# Patient Record
Sex: FEMALE | Race: BLACK OR AFRICAN AMERICAN | NOT HISPANIC OR LATINO | Employment: UNEMPLOYED | URBAN - METROPOLITAN AREA
[De-identification: names, ages, dates, MRNs, and addresses within clinical notes are randomized per-mention and may not be internally consistent; named-entity substitution may affect disease eponyms.]

---

## 2021-09-02 ENCOUNTER — APPOINTMENT (OUTPATIENT)
Dept: GENERAL RADIOLOGY | Facility: HOSPITAL | Age: 76
End: 2021-09-02

## 2021-09-02 ENCOUNTER — HOSPITAL ENCOUNTER (OUTPATIENT)
Dept: CARDIOLOGY | Facility: HOSPITAL | Age: 76
Discharge: HOME | End: 2021-09-02
Admitting: INTERNAL MEDICINE

## 2021-09-02 ENCOUNTER — HOSPITAL ENCOUNTER (OUTPATIENT)
Facility: HOSPITAL | Age: 76
Setting detail: OBSERVATION
Discharge: HOME | End: 2021-09-03
Attending: EMERGENCY MEDICINE | Admitting: INTERNAL MEDICINE

## 2021-09-02 VITALS
RESPIRATION RATE: 16 BRPM | OXYGEN SATURATION: 100 % | DIASTOLIC BLOOD PRESSURE: 75 MMHG | SYSTOLIC BLOOD PRESSURE: 128 MMHG | HEART RATE: 72 BPM

## 2021-09-02 DIAGNOSIS — R07.9 CHEST PAIN, UNSPECIFIED TYPE: Primary | ICD-10-CM

## 2021-09-02 DIAGNOSIS — I10 ESSENTIAL HYPERTENSION: ICD-10-CM

## 2021-09-02 DIAGNOSIS — R07.2 PRECORDIAL PAIN: Primary | ICD-10-CM

## 2021-09-02 LAB
ALBUMIN SERPL-MCNC: 4.5 G/DL (ref 3.5–5.2)
ALBUMIN/GLOB SERPL: 1.7 G/DL
ALP SERPL-CCNC: 58 U/L (ref 39–117)
ALT SERPL W P-5'-P-CCNC: 14 U/L (ref 1–33)
ANION GAP SERPL CALCULATED.3IONS-SCNC: 14.7 MMOL/L (ref 5–15)
AST SERPL-CCNC: 17 U/L (ref 1–32)
BASOPHILS # BLD AUTO: 0.05 10*3/MM3 (ref 0–0.2)
BASOPHILS NFR BLD AUTO: 0.7 % (ref 0–1.5)
BILIRUB SERPL-MCNC: 0.2 MG/DL (ref 0–1.2)
BUN SERPL-MCNC: 16 MG/DL (ref 8–23)
BUN/CREAT SERPL: 20.5 (ref 7–25)
CALCIUM SPEC-SCNC: 9.5 MG/DL (ref 8.6–10.5)
CHLORIDE SERPL-SCNC: 102 MMOL/L (ref 98–107)
CO2 SERPL-SCNC: 23.3 MMOL/L (ref 22–29)
CREAT SERPL-MCNC: 0.78 MG/DL (ref 0.57–1)
DEPRECATED RDW RBC AUTO: 38.4 FL (ref 37–54)
EOSINOPHIL # BLD AUTO: 0.11 10*3/MM3 (ref 0–0.4)
EOSINOPHIL NFR BLD AUTO: 1.5 % (ref 0.3–6.2)
ERYTHROCYTE [DISTWIDTH] IN BLOOD BY AUTOMATED COUNT: 12.7 % (ref 12.3–15.4)
GFR SERPL CREATININE-BSD FRML MDRD: 88 ML/MIN/1.73
GLOBULIN UR ELPH-MCNC: 2.7 GM/DL
GLUCOSE SERPL-MCNC: 80 MG/DL (ref 65–99)
HCT VFR BLD AUTO: 42.1 % (ref 34–46.6)
HGB BLD-MCNC: 12.7 G/DL (ref 12–15.9)
HOLD SPECIMEN: NORMAL
HOLD SPECIMEN: NORMAL
IMM GRANULOCYTES # BLD AUTO: 0.02 10*3/MM3 (ref 0–0.05)
IMM GRANULOCYTES NFR BLD AUTO: 0.3 % (ref 0–0.5)
LYMPHOCYTES # BLD AUTO: 3.08 10*3/MM3 (ref 0.7–3.1)
LYMPHOCYTES NFR BLD AUTO: 42.1 % (ref 19.6–45.3)
MCH RBC QN AUTO: 25.3 PG (ref 26.6–33)
MCHC RBC AUTO-ENTMCNC: 30.2 G/DL (ref 31.5–35.7)
MCV RBC AUTO: 84 FL (ref 79–97)
MONOCYTES # BLD AUTO: 0.53 10*3/MM3 (ref 0.1–0.9)
MONOCYTES NFR BLD AUTO: 7.3 % (ref 5–12)
NEUTROPHILS NFR BLD AUTO: 3.52 10*3/MM3 (ref 1.7–7)
NEUTROPHILS NFR BLD AUTO: 48.1 % (ref 42.7–76)
NRBC BLD AUTO-RTO: 0 /100 WBC (ref 0–0.2)
PLATELET # BLD AUTO: 220 10*3/MM3 (ref 140–450)
PMV BLD AUTO: 10.3 FL (ref 6–12)
POTASSIUM SERPL-SCNC: 3.9 MMOL/L (ref 3.5–5.2)
PROT SERPL-MCNC: 7.2 G/DL (ref 6–8.5)
RBC # BLD AUTO: 5.01 10*6/MM3 (ref 3.77–5.28)
SARS-COV-2 ORF1AB RESP QL NAA+PROBE: NOT DETECTED
SODIUM SERPL-SCNC: 140 MMOL/L (ref 136–145)
TROPONIN T SERPL-MCNC: <0.01 NG/ML (ref 0–0.03)
TROPONIN T SERPL-MCNC: <0.01 NG/ML (ref 0–0.03)
WBC # BLD AUTO: 7.31 10*3/MM3 (ref 3.4–10.8)
WHOLE BLOOD HOLD SPECIMEN: NORMAL
WHOLE BLOOD HOLD SPECIMEN: NORMAL

## 2021-09-02 PROCEDURE — U0004 COV-19 TEST NON-CDC HGH THRU: HCPCS | Performed by: EMERGENCY MEDICINE

## 2021-09-02 PROCEDURE — 84484 ASSAY OF TROPONIN QUANT: CPT | Performed by: EMERGENCY MEDICINE

## 2021-09-02 PROCEDURE — 93005 ELECTROCARDIOGRAM TRACING: CPT | Performed by: EMERGENCY MEDICINE

## 2021-09-02 PROCEDURE — G0378 HOSPITAL OBSERVATION PER HR: HCPCS

## 2021-09-02 PROCEDURE — 99204 OFFICE O/P NEW MOD 45 MIN: CPT | Performed by: INTERNAL MEDICINE

## 2021-09-02 PROCEDURE — 85025 COMPLETE CBC W/AUTO DIFF WBC: CPT

## 2021-09-02 PROCEDURE — 84484 ASSAY OF TROPONIN QUANT: CPT

## 2021-09-02 PROCEDURE — 93005 ELECTROCARDIOGRAM TRACING: CPT

## 2021-09-02 PROCEDURE — C9803 HOPD COVID-19 SPEC COLLECT: HCPCS

## 2021-09-02 PROCEDURE — 99284 EMERGENCY DEPT VISIT MOD MDM: CPT

## 2021-09-02 PROCEDURE — 71046 X-RAY EXAM CHEST 2 VIEWS: CPT

## 2021-09-02 PROCEDURE — 80053 COMPREHEN METABOLIC PANEL: CPT | Performed by: EMERGENCY MEDICINE

## 2021-09-02 PROCEDURE — 36415 COLL VENOUS BLD VENIPUNCTURE: CPT

## 2021-09-02 PROCEDURE — 94760 N-INVAS EAR/PLS OXIMETRY 1: CPT

## 2021-09-02 RX ORDER — ASPIRIN 325 MG
325 TABLET ORAL ONCE
Status: DISCONTINUED | OUTPATIENT
Start: 2021-09-02 | End: 2021-09-03 | Stop reason: HOSPADM

## 2021-09-02 RX ORDER — SODIUM CHLORIDE 0.9 % (FLUSH) 0.9 %
10 SYRINGE (ML) INJECTION AS NEEDED
Status: DISCONTINUED | OUTPATIENT
Start: 2021-09-02 | End: 2021-09-03 | Stop reason: HOSPADM

## 2021-09-02 NOTE — ED PROVIDER NOTES
EMERGENCY DEPARTMENT ENCOUNTER    Room Number:  14/14  Date of encounter:  9/2/2021  PCP: Keri Avila MD  Patient Care Team:  Keri Avila MD as PCP - General (Internal Medicine)   Historian: Patient    HPI:  Chief Complaint: Chest pain  A complete HPI/ROS/PMH/PSH/SH/FH are unobtainable due to: Nothing    Context: Julienne Guadarrama is a 76 y.o. female who presents to the ED c/o chest pain today.  She reports that started around 8:00 this morning.  It was constant until she was sitting in our waiting room.  She reports that it felt like pressure in her left side of her chest.  She reports sometimes it radiated into her left jaw and sometimes down her left arm.  She had no shortness of breath, nausea, vomiting, diaphoresis with it.  She denies prior similar episodes.  Nothing seemed to make it worse or better.  She reports a history of hypertension, prediabetes, mildly elevated cholesterol.  She is not a smoker.  She reports she had problems with arrhythmias in the past but does not currently have a cardiologist.  She reports her last stress test was many years ago.  She denies any history of ischemic heart disease that she knows of.  She has had an aspirin and a half today.  She is currently pain-free.    Prior record review: Evaluated by Latricia Brock at the [facility] [place] [facility] today.  Sent to the emergency room for evaluation and admission.  Could not be a direct admit due to Covid.    PAST MEDICAL HISTORY  Active Ambulatory Problems     Diagnosis Date Noted   • No Active Ambulatory Problems     Resolved Ambulatory Problems     Diagnosis Date Noted   • No Resolved Ambulatory Problems     Past Medical History:   Diagnosis Date   • Hypertension        The patient has a COVID HM Topic on their chart, and they are fully vaccinated.    PAST SURGICAL HISTORY  No past surgical history on file.      FAMILY HISTORY  No family history on file.      SOCIAL HISTORY  Social History     Socioeconomic History   • Marital  status:      Spouse name: Not on file   • Number of children: Not on file   • Years of education: Not on file   • Highest education level: Not on file   Tobacco Use   • Smoking status: Never Smoker   • Smokeless tobacco: Never Used   Substance and Sexual Activity   • Alcohol use: Never         ALLERGIES  Patient has no known allergies.        REVIEW OF SYSTEMS  Review of Systems   Positive chest pain, negative shortness of breath, negative nausea, negative vomiting, negative fever, no chills  All systems reviewed and negative except for those discussed in HPI.       PHYSICAL EXAM    I have reviewed the triage vital signs and nursing notes.    ED Triage Vitals [09/02/21 1620]   Temp Heart Rate Resp BP SpO2   97.7 °F (36.5 °C) 59 16 128/77 100 %      Temp src Heart Rate Source Patient Position BP Location FiO2 (%)   Tympanic Monitor -- -- --       Physical Exam  GENERAL: Awake, alert, no acute distress  SKIN: Warm, dry  HENT: Normocephalic, atraumatic  EYES: no scleral icterus  CV: regular rhythm, regular rate  RESPIRATORY: normal effort, lungs clear  ABDOMEN: soft, nontender, nondistended  MUSCULOSKELETAL: no deformity  NEURO: alert, moves all extremities, follows commands          LAB RESULTS  Recent Results (from the past 24 hour(s))   Comprehensive Metabolic Panel    Collection Time: 09/02/21  4:36 PM    Specimen: Blood   Result Value Ref Range    Glucose 80 65 - 99 mg/dL    BUN 16 8 - 23 mg/dL    Creatinine 0.78 0.57 - 1.00 mg/dL    Sodium 140 136 - 145 mmol/L    Potassium 3.9 3.5 - 5.2 mmol/L    Chloride 102 98 - 107 mmol/L    CO2 23.3 22.0 - 29.0 mmol/L    Calcium 9.5 8.6 - 10.5 mg/dL    Total Protein 7.2 6.0 - 8.5 g/dL    Albumin 4.50 3.50 - 5.20 g/dL    ALT (SGPT) 14 1 - 33 U/L    AST (SGOT) 17 1 - 32 U/L    Alkaline Phosphatase 58 39 - 117 U/L    Total Bilirubin 0.2 0.0 - 1.2 mg/dL    eGFR  African Amer 88 >60 mL/min/1.73    Globulin 2.7 gm/dL    A/G Ratio 1.7 g/dL    BUN/Creatinine Ratio 20.5 7.0 -  25.0    Anion Gap 14.7 5.0 - 15.0 mmol/L   Troponin    Collection Time: 09/02/21  4:36 PM    Specimen: Blood   Result Value Ref Range    Troponin T <0.010 0.000 - 0.030 ng/mL   Green Top (Gel)    Collection Time: 09/02/21  4:36 PM   Result Value Ref Range    Extra Tube Hold for add-ons.    Lavender Top    Collection Time: 09/02/21  4:36 PM   Result Value Ref Range    Extra Tube hold for add-on    Gold Top - SST    Collection Time: 09/02/21  4:36 PM   Result Value Ref Range    Extra Tube Hold for add-ons.    Light Blue Top    Collection Time: 09/02/21  4:36 PM   Result Value Ref Range    Extra Tube hold for add-on    CBC Auto Differential    Collection Time: 09/02/21  4:36 PM    Specimen: Blood   Result Value Ref Range    WBC 7.31 3.40 - 10.80 10*3/mm3    RBC 5.01 3.77 - 5.28 10*6/mm3    Hemoglobin 12.7 12.0 - 15.9 g/dL    Hematocrit 42.1 34.0 - 46.6 %    MCV 84.0 79.0 - 97.0 fL    MCH 25.3 (L) 26.6 - 33.0 pg    MCHC 30.2 (L) 31.5 - 35.7 g/dL    RDW 12.7 12.3 - 15.4 %    RDW-SD 38.4 37.0 - 54.0 fl    MPV 10.3 6.0 - 12.0 fL    Platelets 220 140 - 450 10*3/mm3    Neutrophil % 48.1 42.7 - 76.0 %    Lymphocyte % 42.1 19.6 - 45.3 %    Monocyte % 7.3 5.0 - 12.0 %    Eosinophil % 1.5 0.3 - 6.2 %    Basophil % 0.7 0.0 - 1.5 %    Immature Grans % 0.3 0.0 - 0.5 %    Neutrophils, Absolute 3.52 1.70 - 7.00 10*3/mm3    Lymphocytes, Absolute 3.08 0.70 - 3.10 10*3/mm3    Monocytes, Absolute 0.53 0.10 - 0.90 10*3/mm3    Eosinophils, Absolute 0.11 0.00 - 0.40 10*3/mm3    Basophils, Absolute 0.05 0.00 - 0.20 10*3/mm3    Immature Grans, Absolute 0.02 0.00 - 0.05 10*3/mm3    nRBC 0.0 0.0 - 0.2 /100 WBC       Ordered the above labs and independently reviewed the results.        RADIOLOGY  XR Chest 2 View    Result Date: 9/2/2021  PA AND LATERAL CHEST  CLINICAL HISTORY: Chest pain  The lungs are well-expanded and appear free of infiltrates or masses. There is no pneumothorax. The heart is normal in size.  IMPRESSIONS: Unremarkable chest  x-ray.  This report was finalized on 9/2/2021 5:08 PM by Dr. Vidal Hogue M.D.        I ordered the above noted radiological studies. Reviewed by me and discussed with radiologist.  See dictation for official radiology interpretation.      PROCEDURES    Procedures      MEDICATIONS GIVEN IN ER    Medications   sodium chloride 0.9 % flush 10 mL (has no administration in time range)   aspirin tablet 325 mg (0 mg Oral Hold 9/2/21 1818)         PROGRESS, DATA ANALYSIS, CONSULTS, AND MEDICAL DECISION MAKING    All labs have been independently reviewed by me.  All radiology studies have been reviewed by me and discussed with radiologist dictating the report.   EKG's independently viewed and interpreted by me.  Discussion below represents my analysis of pertinent findings related to patient's condition, differential diagnosis, treatment plan and final disposition.    Differential diagnosis includes but is not limited to non-STEMI, STEMI, chest wall pain, unstable angina.    ED Course as of Sep 02 1831   Thu Sep 02, 2021   1810 EKG          EKG time: 425  Rhythm/Rate: Normal sinus, rate 56  P waves and DC: Normal P, normal DC  QRS, axis: Narrow QRS, left axis  ST and T waves: Normal ST and T waves    Interpreted Contemporaneously by me, independently viewed  No prior EKG available for comparison      [TR]   1811 Heart Score Calculation:History slightly suspicious: 0History moderately suspicious: +1History highly suspicious: +2EKG normal: 0EKG nonspecific repolarization disturbance: +1EKG significant ST deviation: +2Age less than 45: 0Age 45-64: +1Age greater than 65: +2No known risk factors: 01-2 risk factors: +1Greater than 3 risk factors: +2Initial troponin less than the normal limit: 0Initial troponin I-III times normal limit: +1Initial troponin greater than 3 times normal limit: +2Total score: 7    [TR]   1830 Speaking with Dr. Mederos.  Will admit.    [TR]      ED Course User Index  [TR] Isaias Lui MD            PPE: Both the patient and I wore a surgical mask throughout the entire patient encounter. I wore protective goggles.       AS OF 18:31 EDT VITALS:    BP - 126/76  HR - 60  TEMP - 97.7 °F (36.5 °C) (Tympanic)  O2 SATS - 100%        DIAGNOSIS  Final diagnoses:   Chest pain, unspecified type         DISPOSITION  ED Disposition     ED Disposition Condition Comment    Decision to Admit  Level of Care: Telemetry [5]   Diagnosis: Chest pain, unspecified type [[id number]]   Admitting Physician: ROXANA HARO [[id number]]   Attending Physician: ROXANA HARO [[id number]]                  Isaias Lui MD  09/02/21 2913

## 2021-09-02 NOTE — PROGRESS NOTES
Patient Name: Julienne Guadarrama  :1945  76 y.o.    Date of Admission: 2021  Encounter Provider: Latricia Brock MD  Date of Encounter Visit: 21  Place of Service: [facility]  Referring Provider: Rudy Morocho, *      Chief complaint:  Chest pain    History of Present Illness:    This is a lady with a history of hypertension and a history of prediabetes though more recently was told that she no longer had this.  She is never been a smoker.  This morning she started having chest discomfort in her left chest.  Sometimes it went to her jaw and sometimes down her left arm.  She felt better when she sat down and put her feet up.  It came back.  She went to the urgent care.  They did an EKG which does not show acute changes and she was [place] [facility] [place].    Past Medical History:   Diagnosis Date   • Hypertension        History reviewed. No pertinent surgical history.      Prior to Admission medications    Medication Sig Start Date End Date Taking? Authorizing Provider   Aspirin Bupankaj,CaCarb-MgCarb-MgO, 81 MG tablet Take 81 mg by mouth.    Emergency, Nurse Jania, RN   Cholecalciferol 25 MCG (1000 UT) capsule Take 2,000 Units by mouth Daily.    Emergency, Nurse Jania, RN   Ginkgo Biloba Extract 60 MG capsule Take  by mouth.    Emergency, Nurse Epic, RN   triamterene-hydrochlorothiazide (MAXZIDE) 75-50 MG per tablet  21   Emergency, Nurse Epic, RN       No Known Allergies    Social History     Socioeconomic History   • Marital status:      Spouse name: Not on file   • Number of children: Not on file   • Years of education: Not on file   • Highest education level: Not on file   Tobacco Use   • Smoking status: Never Smoker   • Smokeless tobacco: Never Used   Substance and Sexual Activity   • Alcohol use: Never       History reviewed. No pertinent family history.    REVIEW OF SYSTEMS:   Review of Systems    Constitutional: Positive for malaise/fatigue. Negative for chills, fever, weight gain and weight loss.   Cardiovascular: Negative for leg swelling.   Respiratory: Negative for cough, snoring and wheezing.    Hematologic/Lymphatic: Negative for bleeding problem. Does not bruise/bleed easily.   Skin: Negative for color change.   Musculoskeletal: Negative for falls, joint pain and myalgias.   Gastrointestinal: Negative for melena.   Genitourinary: Negative for hematuria.   Neurological: Negative for excessive daytime sleepiness.   Psychiatric/Behavioral: Negative for depression. The patient is not nervous/anxious.           Objective:   There were no vitals filed for this visit.  There is no height or weight on file to calculate BMI.    Constitutional:       General: Not in acute distress.  Neck:      Vascular: No carotid bruit or JVD.   Pulmonary:      Effort: Pulmonary effort is normal.      Breath sounds: Normal breath sounds.   Cardiovascular:      Normal rate. Regular rhythm.      Murmurs: There is no murmur.   Psychiatric:         Mood and Affect: Mood and affect normal.         Procedures    I reviewed her EKG from the urgent care.    Assessment and Plan:       1.  Chest pain.  I am concerned about her chest pain.  She is 76 years old with hypertension.  I am concerned about the way she is describing it.  I wanted to directly admit her to a telemetry bed so we can monitor overnight and probably stress her in the morning.  I am not comfortable just checking labs and sending her home tonight because she is having ongoing symptoms.  Unfortunately, there are no telemetry beds thanks to all the Covid patients.  So I am going to have to send her down to the emergency room since I cannot get her directly admitted.  I will let them go ahead and continue the work-up done there.  She will need troponins and basic labs.  My recommendation is that we admit her for observation tonight.  2.  Hypertension    Latricia ZHANG  MD Delmy  09/02/21  16:08 EDT

## 2021-09-02 NOTE — ED TRIAGE NOTES
Cp since waking this am.  0800    Patient was placed in face mask during first look triage.  Patient was wearing a face mask throughout encounter.  I wore personal protective equipment throughout the encounter.  Hand hygiene was performed before and after patient encounter.

## 2021-09-02 NOTE — ADDENDUM NOTE
Encounter addended by: Amada Waite RN on: 9/2/2021 4:41 PM   Actions taken: Charge Capture section accepted

## 2021-09-02 NOTE — ADDENDUM NOTE
Encounter addended by: Amada Waite RN on: 9/2/2021 4:40 PM   Actions taken: Visit Navigator SmartForm Flowsheet section accepted

## 2021-09-03 ENCOUNTER — APPOINTMENT (OUTPATIENT)
Dept: NUCLEAR MEDICINE | Facility: HOSPITAL | Age: 76
End: 2021-09-03

## 2021-09-03 VITALS
WEIGHT: 166.06 LBS | OXYGEN SATURATION: 99 % | TEMPERATURE: 98.4 F | DIASTOLIC BLOOD PRESSURE: 77 MMHG | SYSTOLIC BLOOD PRESSURE: 135 MMHG | BODY MASS INDEX: 29.42 KG/M2 | RESPIRATION RATE: 18 BRPM | HEART RATE: 76 BPM | HEIGHT: 63 IN

## 2021-09-03 LAB
BH CV REST NUCLEAR ISOTOPE DOSE: 12 MCI
BH CV STRESS BP STAGE 1: NORMAL
BH CV STRESS BP STAGE 2: NORMAL
BH CV STRESS DURATION MIN STAGE 1: 3
BH CV STRESS DURATION MIN STAGE 2: 2
BH CV STRESS DURATION SEC STAGE 1: 0
BH CV STRESS DURATION SEC STAGE 2: 3
BH CV STRESS GRADE STAGE 1: 10
BH CV STRESS GRADE STAGE 2: 10
BH CV STRESS HR STAGE 1: 120
BH CV STRESS HR STAGE 2: 135
BH CV STRESS METS STAGE 1: 5
BH CV STRESS METS STAGE 2: 7.5
BH CV STRESS NUCLEAR ISOTOPE DOSE: 34.3 MCI
BH CV STRESS PROTOCOL 1: NORMAL
BH CV STRESS RECOVERY BP: NORMAL MMHG
BH CV STRESS RECOVERY HR: 78 BPM
BH CV STRESS SPEED STAGE 1: 1.7
BH CV STRESS SPEED STAGE 2: 2.4
BH CV STRESS STAGE 1: 1
BH CV STRESS STAGE 2: 2
LV EF NUC BP: 82 %
MAXIMAL PREDICTED HEART RATE: 146 BPM
PERCENT MAX PREDICTED HR: 92.47 %
STRESS BASELINE BP: NORMAL MMHG
STRESS BASELINE HR: 63 BPM
STRESS PERCENT HR: 109 %
STRESS POST ESTIMATED WORKLOAD: 5.6 METS
STRESS POST EXERCISE DUR MIN: 5 MIN
STRESS POST EXERCISE DUR SEC: 3 SEC
STRESS POST PEAK BP: NORMAL MMHG
STRESS POST PEAK HR: 135 BPM
STRESS TARGET HR: 124 BPM

## 2021-09-03 PROCEDURE — 93016 CV STRESS TEST SUPVJ ONLY: CPT | Performed by: INTERNAL MEDICINE

## 2021-09-03 PROCEDURE — G0378 HOSPITAL OBSERVATION PER HR: HCPCS

## 2021-09-03 PROCEDURE — 78452 HT MUSCLE IMAGE SPECT MULT: CPT

## 2021-09-03 PROCEDURE — 93018 CV STRESS TEST I&R ONLY: CPT | Performed by: INTERNAL MEDICINE

## 2021-09-03 PROCEDURE — A9500 TC99M SESTAMIBI: HCPCS | Performed by: INTERNAL MEDICINE

## 2021-09-03 PROCEDURE — 78452 HT MUSCLE IMAGE SPECT MULT: CPT | Performed by: INTERNAL MEDICINE

## 2021-09-03 PROCEDURE — 99220 PR INITIAL OBSERVATION CARE/DAY 70 MINUTES: CPT | Performed by: INTERNAL MEDICINE

## 2021-09-03 PROCEDURE — 0 TECHNETIUM SESTAMIBI: Performed by: INTERNAL MEDICINE

## 2021-09-03 PROCEDURE — 93017 CV STRESS TEST TRACING ONLY: CPT

## 2021-09-03 RX ADMIN — TECHNETIUM TC 99M SESTAMIBI 1 DOSE: 1 INJECTION INTRAVENOUS at 13:20

## 2021-09-03 RX ADMIN — TECHNETIUM TC 99M SESTAMIBI 1 DOSE: 1 INJECTION INTRAVENOUS at 11:32

## 2021-09-03 NOTE — ACP (ADVANCE CARE PLANNING)
Provided pt with living will Information. Pt asked appropriate questions. Pt will take home with her. Will contact this  if further help needed. Contact information provided.

## 2021-09-03 NOTE — H&P
Patient Name: Julienne Guadarrama  :1945  76 y.o.    Date of Admission: 2021  Date of Consultation:  21  Encounter Provider: Vanessa Carias MD  Place of Service: [facility] CARDIOLOGY  Referring Provider: Hawk Mederos MD  Patient Care Team:  Keri Avila MD as PCP - General (Internal Medicine)      Chief complaint:CP    History of Present Illness:  This is a 76 year old woman with HTN and preDM. She presented to [facility] yesterday for evaluation of chest pain.   Yesterday morning she woke up and had some chest perssure radiating to jaw and shoulder. She stayed home cleaning up and waiting for her  to awake. HE took her to the  where she was referred to [facility]. The EKG was unremarkable. Troponi was negative. She was sent to the ER as there were no available beds for direct admit. Repeat trop in the evening was negative, no further labs have been drawn. No repeat EKG has skye performed.   This morning she woke up around 3AM. She walked in her room 10 times. She felt the presence of the lord, so she jumped up and down and danced. She felt ok in terms of chest pain but became pretty exhuasted while getting dresesd and has been in bed since.   She had similar problems about 10 years ago, had a stress test and was treated for an arrhythmia. Since then, she's felt well. She exercises regularly without pain.       Past Medical History:   Diagnosis Date   • Hypertension        Past Surgical History:   Procedure Laterality Date   • UTERINE FIBROID SURGERY           Prior to Admission medications    Medication Sig Start Date End Date Taking? Authorizing Provider   Aspirin BupankajCaCarb-MgCarb-MgO, 81 MG tablet Take 81 mg by mouth.   Yes Emergency, Nurse Jania RN   Cholecalciferol 25 MCG (1000 UT) capsule Take 2,000 Units by mouth Daily.   Yes Emergency, Nurse Jania RN   Ginkgo Biloba Extract 60 MG capsule Take  by mouth.   Yes Emergency, Nurse Jania RN  "  triamterene-hydrochlorothiazide (MAXZIDE) 75-50 MG per tablet  8/27/21  Yes Emergency, Nurse Epic, RN       Allergies   Allergen Reactions   • Potato Unknown - Low Severity   • Tomato Unknown - Low Severity       Social History     Socioeconomic History   • Marital status:      Spouse name: Not on file   • Number of children: Not on file   • Years of education: Not on file   • Highest education level: Not on file   Tobacco Use   • Smoking status: Never Smoker   • Smokeless tobacco: Never Used   Vaping Use   • Vaping Use: Never used   Substance and Sexual Activity   • Alcohol use: Never   • Drug use: Never   • Sexual activity: Defer       History reviewed. No pertinent family history.    REVIEW OF SYSTEMS:   All systems reviewed.  Pertinent positives identified in HPI.  All other systems are negative.      Objective:     Vitals:    09/02/21 1831 09/02/21 2131 09/02/21 2325 09/03/21 0746   BP: 126/84 122/79 122/90 109/59   BP Location:  Right arm Right arm Right arm   Patient Position:  Sitting Lying Lying   Pulse: 62 84 63 63   Resp:  17 17 18   Temp:   97.5 °F (36.4 °C) 98.1 °F (36.7 °C)   TempSrc:   Oral Oral   SpO2: 95% 96% 100% 99%   Weight:  75.3 kg (166 lb 1 oz)     Height:  160 cm (63\")       Body mass index is 29.42 kg/m².    General Appearance:    Alert, cooperative, in no acute distress   Head:    Normocephalic, without obvious abnormality, atraumatic   Eyes:            Lids and lashes normal, conjunctivae and sclerae normal, no icterus, no pallor, corneas clear, PERRLA   Ears:    Ears appear intact with no abnormalities noted   Throat:   No oral lesions, no thrush, oral mucosa moist   Neck:   No adenopathy, supple, trachea midline, no thyromegaly, no carotid bruit, no JVD   Back:     No kyphosis present, no scoliosis present, no skin lesions, erythema or scars, no tenderness to percussion or palpation, range of motion normal   Lungs:     Clear to auscultation, respirations regular, even and " unlabored    Heart:    Regular rhythm and normal rate, normal S1 and S2, no murmur, no gallop, no rub, no click   Chest Wall:    No abnormalities observed   Abdomen:     Normal bowel sounds, no masses, no organomegaly, soft, nontender, nondistended, no guarding, no rebound  tenderness   Extremities:   Moves all extremities well, no edema, no cyanosis, no redness   Pulses:   Pulses palpable and equal bilaterally. Normal radial, carotid, femoral, dorsalis pedis and posterior tibial pulses bilaterally. Normal abdominal aorta   Skin:  Psychiatric:   No bleeding, bruising or rash    Alert and oriented x 3, normal mood and affect   Lab Review:     Results from last 7 days   Lab Units 09/02/21  1636   SODIUM mmol/L 140   POTASSIUM mmol/L 3.9   CHLORIDE mmol/L 102   CO2 mmol/L 23.3   BUN mg/dL 16   CREATININE mg/dL 0.78   CALCIUM mg/dL 9.5   BILIRUBIN mg/dL 0.2   ALK PHOS U/L 58   ALT (SGPT) U/L 14   AST (SGOT) U/L 17   GLUCOSE mg/dL 80     Results from last 7 days   Lab Units 09/02/21  1821 09/02/21  1636   TROPONIN T ng/mL <0.010 <0.010     Results from last 7 days   Lab Units 09/02/21  1636   WBC 10*3/mm3 7.31   HEMOGLOBIN g/dL 12.7   HEMATOCRIT % 42.1   PLATELETS 10*3/mm3 220                           I personally viewed and interpreted the patient's EKG/Telemetry data.        Assessment and Plan:       1. Typical angina: Negative troponin x 2. Initial EKG normal. Repeat EKG today. I discussed C vs stress test. She prefers to avoid invasive testing if possible. Will plan treadmill nuclear today for risk stratification.     Vanessa Carias MD  09/03/21  10:50 EDT

## 2021-09-09 NOTE — DISCHARGE SUMMARY
Hospital Discharge    Patient Name: Julienne Guadarrama  Age/Sex: 76 y.o. female  : 1945  MRN: [id number]    Encounter Provider: Vanessa Carias MD  Referring Provider: Vanessa Carias MD  Place of Service: [facility] CARDIOLOGY  Patient Care Team:  Keri Avila MD as PCP - General (Internal Medicine)         Date of Discharge:  2021   Date of Admit: 2021    Discharge Condition: Good  Discharge Diagnosis:    Chest pain      Hospital Course:   Julienne Guadarrama is a 76 y.o. female who presented with  Typical angina to the [facility]. Given her ongoing chest pain she was sent to the emergency room for admission.  Her troponins were normal.  EKG was normal.  She had a nuclear stress test as she preferred to avoid invasive testing.  It was unremarkable.  She was discharged home and will follow up in the office in a month for close follow-up..     Objective:     No intake or output data in the 24 hours ending 21 1221  Body mass index is 29.42 kg/m².      21  1813 21  2131   Weight: 73.5 kg (162 lb) 75.3 kg (166 lb 1 oz)     Weight change:     Procedures Performed         Consults:  Consults     Date and Time Order Name Status Description    2021  6:11 PM LCG (on-call MD unless specified) Completed           Pertinent Test Results:  Results from last 7 days   Lab Units 21  1636   SODIUM mmol/L 140   POTASSIUM mmol/L 3.9   CHLORIDE mmol/L 102   CO2 mmol/L 23.3   BUN mg/dL 16   CREATININE mg/dL 0.78   GLUCOSE mg/dL 80   CALCIUM mg/dL 9.5   AST (SGOT) U/L 17   ALT (SGPT) U/L 14     Results from last 7 days   Lab Units 21  1821 21  1636   TROPONIN T ng/mL <0.010 <0.010     Results from last 7 days   Lab Units 21  1636   WBC 10*3/mm3 7.31   HEMOGLOBIN g/dL 12.7   HEMATOCRIT % 42.1   PLATELETS 10*3/mm3 220                   Invalid input(s): LDLCALC            Discharge Medications     Discharge Medications      Continue These Medications       Instructions Start Date   Aspirin Buf(CaCarb-MgCarb-MgO) 81 MG tablet   81 mg, Oral      Cholecalciferol 25 MCG (1000 UT) capsule   2,000 Units, Oral, Daily      Ginkgo Biloba Extract 60 MG capsule   Oral      triamterene-hydrochlorothiazide 75-50 MG per tablet  Commonly known as: MAXZIDE   No dose, route, or frequency recorded.             Discharge Diet:  heart healthy    Activity at Discharge:   as tolerated    Discharge disposition: home     Discharge Instructions and Follow ups:  Future Appointments   Date Time Provider Department Center   10/11/2021 12:00 PM Latricia Brock MD [facility]     Follow-up Information     Latricia Brock MD Follow up in 1 month(s).    Specialty: Cardiology  Contact information:  [address] [place], [place] [id number] [phone]             Keri Avila MD. Schedule an appointment as soon as possible for a visit in 1 week(s).    Specialty: Internal Medicine  Contact information:  [address] [id number] [phone]                   Test Results Pending at Discharge:      Vanessa Carias MD  09/09/21  12:21 EDT    Time: Discharge 20 min    EMR Dragon/Transcription disclaimer:   Much of this encounter note is an electronic transcription/translation of spoken language to printed text. The electronic translation of spoken language may permit erroneous, or at times, nonsensical words or phrases to be inadvertently transcribed; Although I have reviewed the note for such errors, some may still exist.

## 2021-10-11 ENCOUNTER — OFFICE VISIT (OUTPATIENT)
Dept: CARDIOLOGY | Facility: CLINIC | Age: 76
End: 2021-10-11

## 2021-10-11 VITALS
HEIGHT: 63 IN | DIASTOLIC BLOOD PRESSURE: 74 MMHG | WEIGHT: 168 LBS | HEART RATE: 70 BPM | SYSTOLIC BLOOD PRESSURE: 114 MMHG | BODY MASS INDEX: 29.77 KG/M2

## 2021-10-11 DIAGNOSIS — R00.2 PALPITATIONS: ICD-10-CM

## 2021-10-11 DIAGNOSIS — I10 PRIMARY HYPERTENSION: Primary | ICD-10-CM

## 2021-10-11 PROCEDURE — 93000 ELECTROCARDIOGRAM COMPLETE: CPT | Performed by: INTERNAL MEDICINE

## 2021-10-11 PROCEDURE — 99214 OFFICE O/P EST MOD 30 MIN: CPT | Performed by: INTERNAL MEDICINE

## 2021-10-11 NOTE — PROGRESS NOTES
"      CARDIOLOGY    Latricia Brock MD    ENCOUNTER DATE:  10/11/2021    Julienne Guadarrama / 76 y.o. / female        CHIEF COMPLAINT / REASON FOR OFFICE VISIT     Rapid Heart Rate      HISTORY OF PRESENT ILLNESS       HPI    Julienne Guadarrama is a 76 y.o. female     This is a [place] I [place] in [place] [facility] [place].  She has a history of hypertension.  She presented with some chest discomfort.  Unfortunately was late in the day and we would not be able to do a stress test.  I admitted her to the hospital.  She had a nuclear stress test the next day which did not show any ischemia.  She went 5 minutes on the Arturo protocol.    She has not had any recurrence of the same type of chest discomfort.  She woke up this morning and her heart was going fast but was regular.  It was not like the A. fib that she had in the remote past.  She has not had this before.    I encouraged regular exercise.  Follow-up with her primary care provider.    REVIEW OF SYSTEMS     Review of Systems   Constitutional: Negative for chills, fever, weight gain and weight loss.   Cardiovascular: Negative for leg swelling.   Respiratory: Negative for cough, snoring and wheezing.    Hematologic/Lymphatic: Negative for bleeding problem. Does not bruise/bleed easily.   Skin: Negative for color change.   Musculoskeletal: Negative for falls, joint pain and myalgias.   Gastrointestinal: Negative for melena.   Genitourinary: Negative for hematuria.   Neurological: Negative for excessive daytime sleepiness.   Psychiatric/Behavioral: Negative for depression. The patient is not nervous/anxious.          VITAL SIGNS     Visit Vitals  /74   Pulse 70   Ht 160 cm (63\")   Wt 76.2 kg (168 lb)   BMI 29.76 kg/m²         Wt Readings from Last 3 Encounters:   10/11/21 76.2 kg (168 lb)   09/02/21 75.3 kg (166 lb 1 oz)   09/02/21 73.5 kg (162 lb)     Body mass index is 29.76 kg/m².      PHYSICAL EXAMINATION     Constitutional:       General: Not in acute distress.  Neck: "      Vascular: No carotid bruit or JVD.   Pulmonary:      Effort: Pulmonary effort is normal.      Breath sounds: Normal breath sounds.   Cardiovascular:      Normal rate. Regular rhythm.      Murmurs: There is no murmur.   Psychiatric:         Mood and Affect: Mood and affect normal.           REVIEWED DATA       ECG 12 Lead    Date/Time: 10/11/2021 1:03 PM  Performed by: Latricia Brock MD  Authorized by: Latricia Brock MD   Comparison: compared with previous ECG from 9/2/2021  Similar to previous ECG  Rhythm: sinus rhythm  BPM: 70  Conduction: conduction normal  ST Segments: ST segments normal  T Waves: T waves normal  Other findings: poor R wave progression    Clinical impression: normal ECG                  Lab Results   Component Value Date    GLUCOSE 80 09/02/2021    BUN 16 09/02/2021    CREATININE 0.78 09/02/2021    EGFRIFAFRI 88 09/02/2021    BCR 20.5 09/02/2021    K 3.9 09/02/2021    CO2 23.3 09/02/2021    CALCIUM 9.5 09/02/2021    ALBUMIN 4.50 09/02/2021    LABIL2 1.7 07/21/2021    AST 17 09/02/2021    ALT 14 09/02/2021       ASSESSMENT & PLAN      Diagnosis Plan   1. Primary hypertension     2. Palpitations         1.  Chest pain.  This has not recurred.  Normal stress test September 2021.  Low suspicion of anything cardiac going on at this time.  I reassured her and encouraged her to follow-up with her primary provider about all of her other noncardiac questions.  2.  History of hypertension  3.  Insomnia  4.  Palpitations.  She did have one episode of this.  I told her to call me if she has recurrence.  5.  Remote history of atrial fibrillation.  No symptoms that sound consistent with atrial fibrillation recently.    I reviewed her stress test with her and attempted to answer all of her questions though many were not related to cardiac issues.    Call with any worsening symptoms.  Otherwise follow-up with primary care provider      Orders Placed This Encounter   Procedures   • ECG 12 Lead     This  order was created via procedure documentation     Order Specific Question:   Release to patient     Answer:   Immediate           MEDICATIONS         Discharge Medications          Accurate as of October 11, 2021  1:03 PM. If you have any questions, ask your nurse or doctor.            Continue These Medications      Instructions Start Date   Aspirin Buf(CaCarb-MgCarb-MgO) 81 MG tablet   81 mg, Oral, Daily      Cholecalciferol 25 MCG (1000 UT) capsule   2,000 Units, Oral, Daily      Ginkgo Biloba Extract 60 MG capsule   Oral, Daily      NON FORMULARY   Lions Dereck po daily       triamterene-hydrochlorothiazide 75-50 MG per tablet  Commonly known as: MAXZIDE   1 tablet, Oral, Daily      Zinc 50 MG capsule   Oral, As Needed               Latricia Brock MD  10/11/21  13:03 EDT    **Mac Disclaimer:   Much of this encounter note is an electronic transcription/translation of spoken language to printed text. The electronic translation of spoken language may permit erroneous, or at times, nonsensical words or phrases to be inadvertently transcribed. Although I have reviewed the note for such errors, some may still exist.

## 2021-11-19 ENCOUNTER — APPOINTMENT (OUTPATIENT)
Dept: GENERAL RADIOLOGY | Facility: HOSPITAL | Age: 76
End: 2021-11-19

## 2021-11-19 PROCEDURE — 73610 X-RAY EXAM OF ANKLE: CPT | Performed by: FAMILY MEDICINE

## 2021-11-19 PROCEDURE — 73630 X-RAY EXAM OF FOOT: CPT | Performed by: FAMILY MEDICINE

## 2023-08-03 ENCOUNTER — HOSPITAL ENCOUNTER (EMERGENCY)
Facility: HOSPITAL | Age: 78
Discharge: HOME | End: 2023-08-04
Attending: EMERGENCY MEDICINE
Payer: MEDICARE

## 2023-08-03 ENCOUNTER — APPOINTMENT (OUTPATIENT)
Dept: GENERAL RADIOLOGY | Facility: HOSPITAL | Age: 78
End: 2023-08-03
Payer: MEDICARE

## 2023-08-03 VITALS
DIASTOLIC BLOOD PRESSURE: 73 MMHG | SYSTOLIC BLOOD PRESSURE: 143 MMHG | BODY MASS INDEX: 32.1 KG/M2 | RESPIRATION RATE: 14 BRPM | TEMPERATURE: 98.5 F | WEIGHT: 170 LBS | HEART RATE: 76 BPM | HEIGHT: 61 IN | OXYGEN SATURATION: 96 %

## 2023-08-03 DIAGNOSIS — R07.9 CHEST PAIN, UNSPECIFIED TYPE: Primary | ICD-10-CM

## 2023-08-03 LAB
ALBUMIN SERPL-MCNC: 4.3 G/DL (ref 3.5–5.2)
ALBUMIN/GLOB SERPL: 1.7 G/DL
ALP SERPL-CCNC: 62 U/L (ref 39–117)
ALT SERPL W P-5'-P-CCNC: 14 U/L (ref 1–33)
ANION GAP SERPL CALCULATED.3IONS-SCNC: 7.8 MMOL/L (ref 5–15)
AST SERPL-CCNC: 17 U/L (ref 1–32)
BASOPHILS # BLD AUTO: 0.02 10*3/MM3 (ref 0–0.2)
BASOPHILS NFR BLD AUTO: 0.3 % (ref 0–1.5)
BILIRUB SERPL-MCNC: 0.2 MG/DL (ref 0–1.2)
BUN SERPL-MCNC: 17 MG/DL (ref 8–23)
BUN/CREAT SERPL: 17.7 (ref 7–25)
CALCIUM SPEC-SCNC: 9.6 MG/DL (ref 8.6–10.5)
CHLORIDE SERPL-SCNC: 104 MMOL/L (ref 98–107)
CO2 SERPL-SCNC: 28.2 MMOL/L (ref 22–29)
CREAT SERPL-MCNC: 0.96 MG/DL (ref 0.57–1)
D DIMER PPP FEU-MCNC: 0.97 MCGFEU/ML (ref 0–0.76)
DEPRECATED RDW RBC AUTO: 43.7 FL (ref 37–54)
EGFRCR SERPLBLD CKD-EPI 2021: 61.4 ML/MIN/1.73
EOSINOPHIL # BLD AUTO: 0.14 10*3/MM3 (ref 0–0.4)
EOSINOPHIL NFR BLD AUTO: 2 % (ref 0.3–6.2)
ERYTHROCYTE [DISTWIDTH] IN BLOOD BY AUTOMATED COUNT: 14.4 % (ref 12.3–15.4)
GLOBULIN UR ELPH-MCNC: 2.5 GM/DL
GLUCOSE SERPL-MCNC: 112 MG/DL (ref 65–99)
HCT VFR BLD AUTO: 38.5 % (ref 34–46.6)
HGB BLD-MCNC: 11.9 G/DL (ref 12–15.9)
HOLD SPECIMEN: NORMAL
HOLD SPECIMEN: NORMAL
IMM GRANULOCYTES # BLD AUTO: 0 10*3/MM3 (ref 0–0.05)
IMM GRANULOCYTES NFR BLD AUTO: 0 % (ref 0–0.5)
LYMPHOCYTES # BLD AUTO: 2.97 10*3/MM3 (ref 0.7–3.1)
LYMPHOCYTES NFR BLD AUTO: 41.6 % (ref 19.6–45.3)
MCH RBC QN AUTO: 25.1 PG (ref 26.6–33)
MCHC RBC AUTO-ENTMCNC: 30.9 G/DL (ref 31.5–35.7)
MCV RBC AUTO: 81.2 FL (ref 79–97)
MONOCYTES # BLD AUTO: 0.68 10*3/MM3 (ref 0.1–0.9)
MONOCYTES NFR BLD AUTO: 9.5 % (ref 5–12)
NEUTROPHILS NFR BLD AUTO: 3.33 10*3/MM3 (ref 1.7–7)
NEUTROPHILS NFR BLD AUTO: 46.6 % (ref 42.7–76)
PLATELET # BLD AUTO: 217 10*3/MM3 (ref 140–450)
PMV BLD AUTO: 9.5 FL (ref 6–12)
POTASSIUM SERPL-SCNC: 4.1 MMOL/L (ref 3.5–5.2)
PROT SERPL-MCNC: 6.8 G/DL (ref 6–8.5)
RBC # BLD AUTO: 4.74 10*6/MM3 (ref 3.77–5.28)
SODIUM SERPL-SCNC: 140 MMOL/L (ref 136–145)
TROPONIN T SERPL HS-MCNC: 10 NG/L
WBC NRBC COR # BLD: 7.14 10*3/MM3 (ref 3.4–10.8)
WHOLE BLOOD HOLD COAG: NORMAL
WHOLE BLOOD HOLD SPECIMEN: NORMAL

## 2023-08-03 PROCEDURE — 71046 X-RAY EXAM CHEST 2 VIEWS: CPT

## 2023-08-03 PROCEDURE — 93005 ELECTROCARDIOGRAM TRACING: CPT | Performed by: EMERGENCY MEDICINE

## 2023-08-03 PROCEDURE — 85379 FIBRIN DEGRADATION QUANT: CPT | Performed by: EMERGENCY MEDICINE

## 2023-08-03 PROCEDURE — 80053 COMPREHEN METABOLIC PANEL: CPT | Performed by: EMERGENCY MEDICINE

## 2023-08-03 PROCEDURE — 93010 ELECTROCARDIOGRAM REPORT: CPT | Performed by: INTERNAL MEDICINE

## 2023-08-03 PROCEDURE — 99285 EMERGENCY DEPT VISIT HI MDM: CPT

## 2023-08-03 PROCEDURE — 85025 COMPLETE CBC W/AUTO DIFF WBC: CPT | Performed by: EMERGENCY MEDICINE

## 2023-08-03 PROCEDURE — 84484 ASSAY OF TROPONIN QUANT: CPT | Performed by: EMERGENCY MEDICINE

## 2023-08-03 RX ORDER — SODIUM CHLORIDE 0.9 % (FLUSH) 0.9 %
10 SYRINGE (ML) INJECTION AS NEEDED
Status: DISCONTINUED | OUTPATIENT
Start: 2023-08-03 | End: 2023-08-04 | Stop reason: HOSPADM

## 2023-08-03 RX ORDER — ASPIRIN 325 MG
325 TABLET ORAL ONCE
Status: COMPLETED | OUTPATIENT
Start: 2023-08-03 | End: 2023-08-03

## 2023-08-03 RX ADMIN — ASPIRIN 325 MG: 325 TABLET ORAL at 22:25

## 2023-08-04 ENCOUNTER — APPOINTMENT (OUTPATIENT)
Dept: CT IMAGING | Facility: HOSPITAL | Age: 78
End: 2023-08-04
Payer: MEDICARE

## 2023-08-04 LAB
GEN 5 2HR TROPONIN T REFLEX: 7 NG/L
QT INTERVAL: 380 MS
TROPONIN T DELTA: -3 NG/L

## 2023-08-04 PROCEDURE — 36415 COLL VENOUS BLD VENIPUNCTURE: CPT

## 2023-08-04 PROCEDURE — 71275 CT ANGIOGRAPHY CHEST: CPT

## 2023-08-04 PROCEDURE — 84484 ASSAY OF TROPONIN QUANT: CPT | Performed by: EMERGENCY MEDICINE

## 2023-08-04 PROCEDURE — 25510000001 IOPAMIDOL PER 1 ML: Performed by: EMERGENCY MEDICINE

## 2023-08-04 RX ADMIN — IOPAMIDOL 100 ML: 755 INJECTION, SOLUTION INTRAVENOUS at 01:07

## 2023-08-04 NOTE — FSED PROVIDER NOTE
Subjective   History of Present Illness  The patient is a 78-year-old female with a chief complaint of chest pain.  She states that she recently came back from car ride from [place].  She states that starting today she has had intermittent left-sided chest pressure that lasts for about 2 to 3 seconds at a time and resolves on its own.  She also reports some headache today.  She denies any associated shortness of breath, hemoptysis, or blood clot history.  No fevers.  She denies any exertional component to the pain.  She reports having had a negative stress test in 2021.  Review of Systems   Constitutional:  Negative for fever.   Respiratory:  Negative for shortness of breath.    Cardiovascular:  Positive for chest pain.   Gastrointestinal:  Negative for diarrhea and vomiting.   Skin:  Negative for rash.   All other systems reviewed and are negative.    Past Medical History:   Diagnosis Date    Hypertension        Allergies   Allergen Reactions    Other Other (See Comments)     BEE STING    Potato Unknown - Low Severity    Tomato Unknown - Low Severity       Past Surgical History:   Procedure Laterality Date    HYSTERECTOMY      UTERINE FIBROID SURGERY         History reviewed. No pertinent family history.    Social History     Socioeconomic History    Marital status:    Tobacco Use    Smoking status: Never    Smokeless tobacco: Never   Vaping Use    Vaping Use: Never used   Substance and Sexual Activity    Alcohol use: Never     Comment: No caffeine use    Drug use: Never    Sexual activity: Defer           Objective   Physical Exam  Constitutional:       General: She is not in acute distress.     Appearance: Normal appearance. She is not ill-appearing.   HENT:      Mouth/Throat:      Mouth: Mucous membranes are moist.   Eyes:      Conjunctiva/sclera: Conjunctivae normal.   Cardiovascular:      Rate and Rhythm: Normal rate and regular rhythm.   Pulmonary:      Effort: Pulmonary effort is normal.      Breath  "sounds: Normal breath sounds.   Abdominal:      General: There is no distension.      Tenderness: There is no abdominal tenderness.   Musculoskeletal:         General: Normal range of motion.      Cervical back: Normal range of motion.   Skin:     Findings: No rash.   Neurological:      General: No focal deficit present.      Mental Status: She is alert.   Psychiatric:         Mood and Affect: Mood normal.       Procedures           ED Course  ED Course as of 08/04/23 0350   Fri Aug 04, 2023   0345 CTA negative for pulmonary embolism or dissection.  Troponin trended down to 7.  Patient states that she would like to go home and pursue outpatient work-up of the chest discomfort rather than being admitted.  Recommend close outpatient follow-up and outpatient stress test.  Return precautions given. []   0346 EKG read \"normal sinus rhythm with a rate of 72, normal ME interval, normal QTc, nonspecific ST changes, no STEMI [DH]      ED Course User Index  [DH] Manoj Helms MD                      HEART Score: 4                      Medical Decision Making  Problems Addressed:  Chest pain, unspecified type: complicated acute illness or injury    Amount and/or Complexity of Data Reviewed  Labs: ordered.  Radiology: ordered.  ECG/medicine tests: ordered.    Risk  OTC drugs.  Prescription drug management.    78-year-old female presents with chest pain.  Will check EKG, troponin.  Chest x-ray.  D-dimer to screen for PE given recent long distance car ride.  Basic lab work.    Final diagnoses:   Chest pain, unspecified type       ED Disposition  ED Disposition       ED Disposition   Discharge    Condition   Stable    Comment   --               Keri Avila MD  [address] [place] [address]  [phone]               Medication List      No changes were made to your prescriptions during this visit.         "

## 2023-08-04 NOTE — DISCHARGE INSTRUCTIONS
Talk to your doctor about an outpatient stress test to look further at the heart.  Follow-up with your doctor as soon as possible.  Return for worsening symptoms or for any other concerns.